# Patient Record
Sex: FEMALE | Race: WHITE | NOT HISPANIC OR LATINO | URBAN - METROPOLITAN AREA
[De-identification: names, ages, dates, MRNs, and addresses within clinical notes are randomized per-mention and may not be internally consistent; named-entity substitution may affect disease eponyms.]

---

## 2020-09-17 ENCOUNTER — EMERGENCY (EMERGENCY)
Facility: HOSPITAL | Age: 85
LOS: 1 days | Discharge: ANOTHER HOSPITAL | End: 2020-09-17
Attending: EMERGENCY MEDICINE | Admitting: EMERGENCY MEDICINE
Payer: MEDICARE

## 2020-09-17 VITALS
HEART RATE: 60 BPM | HEIGHT: 64 IN | TEMPERATURE: 98 F | OXYGEN SATURATION: 97 % | SYSTOLIC BLOOD PRESSURE: 100 MMHG | DIASTOLIC BLOOD PRESSURE: 50 MMHG | WEIGHT: 160.06 LBS | RESPIRATION RATE: 16 BRPM

## 2020-09-17 DIAGNOSIS — S09.90XA UNSPECIFIED INJURY OF HEAD, INITIAL ENCOUNTER: ICD-10-CM

## 2020-09-17 PROCEDURE — 72125 CT NECK SPINE W/O DYE: CPT | Mod: 26

## 2020-09-17 PROCEDURE — 99284 EMERGENCY DEPT VISIT MOD MDM: CPT | Mod: CS

## 2020-09-17 PROCEDURE — 70450 CT HEAD/BRAIN W/O DYE: CPT | Mod: 26

## 2020-09-17 NOTE — ED ADULT NURSE NOTE - CHIEF COMPLAINT QUOTE
BIB EMS from [facility] for fall with possible head injury. Patient also removed her indwelling mae catheter while en route.

## 2020-09-17 NOTE — ED ADULT NURSE REASSESSMENT NOTE - NS ED NURSE REASSESS COMMENT FT1
Care of patient received from RN Haven Hdz. Plan of care explained to patient but with minimal understanding due to baseline confusion. Patient changed and adjusted in bed, provided with call bell/ remote. Awaiting CT results at this time.

## 2020-09-17 NOTE — ED ADULT TRIAGE NOTE - CHIEF COMPLAINT QUOTE
BIB EMS from [facility] for fall with possible head injury. Patient also removed her indwelling mae catheter while en route.

## 2020-09-17 NOTE — ED ADULT NURSE NOTE - OBJECTIVE STATEMENT
Pt BIB EMS from [facility] for fall. As per [facility] staff, pt fell out of bed and hit the back of her head.  As per EMS, Patient also removed her indwelling mae catheter while en route to ED in ambulance. pt able to move all extremities well. pt with PMH of dementia, CVA, hemiparesis and hemiplegia from CVA. no nonverbal indicators of pain present. pt on blood thinner, takes xarelto 10 mg daily.

## 2020-09-18 ENCOUNTER — EMERGENCY (EMERGENCY)
Facility: HOSPITAL | Age: 85
LOS: 1 days | Discharge: ANOTHER HOSPITAL | End: 2020-09-18
Admitting: INTERNAL MEDICINE
Payer: MEDICARE

## 2020-09-18 VITALS
RESPIRATION RATE: 18 BRPM | DIASTOLIC BLOOD PRESSURE: 44 MMHG | SYSTOLIC BLOOD PRESSURE: 134 MMHG | OXYGEN SATURATION: 95 % | HEART RATE: 72 BPM

## 2020-09-18 LAB — SARS-COV-2 RNA SPEC QL NAA+PROBE: SIGNIFICANT CHANGE UP

## 2020-09-18 PROCEDURE — 99285 EMERGENCY DEPT VISIT HI MDM: CPT | Mod: 25

## 2020-09-18 PROCEDURE — 71045 X-RAY EXAM CHEST 1 VIEW: CPT

## 2020-09-18 PROCEDURE — 99284 EMERGENCY DEPT VISIT MOD MDM: CPT | Mod: 25

## 2020-09-18 PROCEDURE — 87040 BLOOD CULTURE FOR BACTERIA: CPT

## 2020-09-18 PROCEDURE — 93005 ELECTROCARDIOGRAM TRACING: CPT

## 2020-09-18 PROCEDURE — 83605 ASSAY OF LACTIC ACID: CPT

## 2020-09-18 PROCEDURE — 82803 BLOOD GASES ANY COMBINATION: CPT

## 2020-09-18 PROCEDURE — 80053 COMPREHEN METABOLIC PANEL: CPT

## 2020-09-18 PROCEDURE — 36415 COLL VENOUS BLD VENIPUNCTURE: CPT

## 2020-09-18 PROCEDURE — 81001 URINALYSIS AUTO W/SCOPE: CPT

## 2020-09-18 PROCEDURE — 87086 URINE CULTURE/COLONY COUNT: CPT

## 2020-09-18 PROCEDURE — 84484 ASSAY OF TROPONIN QUANT: CPT

## 2020-09-18 PROCEDURE — U0003: CPT

## 2020-09-18 PROCEDURE — 85025 COMPLETE CBC W/AUTO DIFF WBC: CPT

## 2020-09-18 PROCEDURE — 82962 GLUCOSE BLOOD TEST: CPT

## 2020-09-18 PROCEDURE — 85610 PROTHROMBIN TIME: CPT

## 2020-09-18 PROCEDURE — 96365 THER/PROPH/DIAG IV INF INIT: CPT

## 2020-09-18 PROCEDURE — 72125 CT NECK SPINE W/O DYE: CPT

## 2020-09-18 PROCEDURE — 70450 CT HEAD/BRAIN W/O DYE: CPT

## 2020-09-18 PROCEDURE — 80048 BASIC METABOLIC PNL TOTAL CA: CPT

## 2020-09-18 PROCEDURE — 51702 INSERT TEMP BLADDER CATH: CPT

## 2020-09-18 RX ORDER — RIVAROXABAN 15 MG-20MG
1 KIT ORAL
Qty: 0 | Refills: 0 | DISCHARGE

## 2020-09-18 RX ORDER — FAMOTIDINE 10 MG/ML
1 INJECTION INTRAVENOUS
Qty: 0 | Refills: 0 | DISCHARGE

## 2020-09-18 RX ORDER — ATORVASTATIN CALCIUM 80 MG/1
1 TABLET, FILM COATED ORAL
Qty: 0 | Refills: 0 | DISCHARGE

## 2020-09-18 RX ORDER — METOPROLOL TARTRATE 50 MG
1 TABLET ORAL
Qty: 0 | Refills: 0 | DISCHARGE

## 2020-09-18 NOTE — ED PROVIDER NOTE - PHYSICAL EXAMINATION
Gen:  alert, awake, no acute distress  HEENT:  small hematoma to posterior scalp, airway clear, pupils equal and round  CV:  rrr, nl S1, S2, no m/r/g  Pulm:  lungs CTA b/l  Abd: s/nt/nd, +BS  Ext:  moving all extremities  Neuro:  grossly intact, no focal deficits  Skin:  clear, dry, intact  Psych: AOx3, normal affect, no apparent risk to self or others

## 2020-09-18 NOTE — ED PROVIDER NOTE - OBJECTIVE STATEMENT
pt sent from rehab for fall out of bed with noted head trauma, bump to back of head, pt on xarelto, sent for head CT.  pt with h/o CVA and hemiplegia, dementia

## 2020-09-18 NOTE — ED ADULT NURSE REASSESSMENT NOTE - NS ED NURSE REASSESS COMMENT FT1
Patient sleeping at this time. CT results negative. Patient dispo updated to discharged pending final covid results. Swab sent to [facility] for testing at this time as per Laboratory.

## 2020-09-18 NOTE — ED ADULT NURSE REASSESSMENT NOTE - NS ED NURSE REASSESS COMMENT FT1
RN spoke with patient's sister, updated as to plan of care. Final covid result negative, transport called. Patient sleeping at this time.

## 2020-09-18 NOTE — ED ADULT NURSE REASSESSMENT NOTE - NS ED NURSE REASSESS COMMENT FT1
Assumed care of patient. Patient resting comfortably. No apparent distress. Awaiting transport back to nursing home. Will continue to monitor.

## 2020-09-18 NOTE — ED PROVIDER NOTE - PATIENT PORTAL LINK FT
You can access the FollowMyHealth Patient Portal offered by [facility] by registering at the following website: [email or web address]. By joining [facility]’s FollowMyHealth portal, you will also be able to view your health information using other applications (apps) compatible with our system.

## 2020-09-18 NOTE — ED PROVIDER NOTE - NSFOLLOWUPINSTRUCTIONS_ED_ALL_ED_FT
-- Return to the ER for worsening or persistent symptoms, and/or ANY NEW OR CONCERNING SYMPTOMS.    -- If you have difficulty following up, please call: [phone] to obtain a [facility] doctor or specialist who takes your insurance in your area.

## 2020-09-18 NOTE — ED ADULT NURSE REASSESSMENT NOTE - NS ED NURSE REASSESS COMMENT FT1
RN spoke with [facility] in regards to covid swab, result expected around 6:55am. Patient sleeping at this time, in direct view of nurses station. Will continue to monitor.

## 2020-10-07 PROBLEM — F03.90 UNSPECIFIED DEMENTIA WITHOUT BEHAVIORAL DISTURBANCE: Chronic | Status: ACTIVE | Noted: 2020-09-17

## 2020-10-07 PROBLEM — G81.90 HEMIPLEGIA, UNSPECIFIED AFFECTING UNSPECIFIED SIDE: Chronic | Status: ACTIVE | Noted: 2020-09-17

## 2020-10-07 PROBLEM — I63.9 CEREBRAL INFARCTION, UNSPECIFIED: Chronic | Status: ACTIVE | Noted: 2020-09-17

## 2022-04-28 ENCOUNTER — RESULT REVIEW (OUTPATIENT)
Age: 87
End: 2022-04-28

## 2022-05-09 ENCOUNTER — APPOINTMENT (OUTPATIENT)
Dept: OTOLARYNGOLOGY | Facility: CLINIC | Age: 87
End: 2022-05-09
Payer: MEDICARE

## 2022-05-09 VITALS
BODY MASS INDEX: 23.04 KG/M2 | SYSTOLIC BLOOD PRESSURE: 120 MMHG | DIASTOLIC BLOOD PRESSURE: 80 MMHG | WEIGHT: 130 LBS | HEIGHT: 63 IN

## 2022-05-09 DIAGNOSIS — H61.20 IMPACTED CERUMEN, UNSPECIFIED EAR: ICD-10-CM

## 2022-05-09 PROBLEM — Z00.00 ENCOUNTER FOR PREVENTIVE HEALTH EXAMINATION: Status: ACTIVE | Noted: 2022-05-09

## 2022-05-09 PROCEDURE — 69210 REMOVE IMPACTED EAR WAX UNI: CPT

## 2022-05-09 PROCEDURE — 99213 OFFICE O/P EST LOW 20 MIN: CPT | Mod: 25

## 2022-05-09 RX ORDER — OFLOXACIN OTIC 3 MG/ML
0.3 SOLUTION AURICULAR (OTIC)
Qty: 1 | Refills: 0 | Status: ACTIVE | COMMUNITY
Start: 2022-05-09

## 2022-05-09 NOTE — PHYSICAL EXAM
[Binocular Microscopic Exam] : Binocular microscopic exam was performed [Normal] : the left mastoid was normal [FreeTextEntry8] : cerumen impaction [FreeTextEntry9] : cerumen impaction

## 2022-05-09 NOTE — HISTORY OF PRESENT ILLNESS
[de-identified] : 89y F presents for perceived hearing loss\par Patient has been having difficulty hearing those around her\par Patient denies any otorrhea, otalgia, recent ear infections

## 2022-05-09 NOTE — REASON FOR VISIT
[Initial Evaluation] : an initial evaluation for [Other: _____] : [unfilled] [FreeTextEntry2] : patient is here for wax removal

## 2022-05-09 NOTE — ASSESSMENT
[FreeTextEntry1] : 89Y F with bilateral cerumen impactions. Tolerated cerumen removal\par Recommend\par -Discussed not using q-tips \par -Olive or mineral oil 3 times a week to keep ear canal lubricated. Discussed that the ear is a self cleaning structure and just allow it clean itself. If wax builds up can try debrox. Once it gets impacted recommend return to get it cleaned out.\par -RTC annual or sooner if any symptoms worsen\par Oflaxacin 5 drops bid right ear for 2 days\par \par

## 2024-12-15 NOTE — ED ADULT NURSE REASSESSMENT NOTE - NS ED NURSE REASSESS COMMENT FT1
Rapid covid swab result negative as per Ronda in the Laboratory. Regular rate and rhythm, Heart sounds S1 S2 present, no murmurs, rubs or gallops